# Patient Record
Sex: MALE | URBAN - METROPOLITAN AREA
[De-identification: names, ages, dates, MRNs, and addresses within clinical notes are randomized per-mention and may not be internally consistent; named-entity substitution may affect disease eponyms.]

---

## 2021-07-20 ENCOUNTER — OFFICE VISIT (OUTPATIENT)
Dept: URBAN - METROPOLITAN AREA CLINIC 24 | Facility: CLINIC | Age: 61
End: 2021-07-20
Payer: COMMERCIAL

## 2021-07-20 DIAGNOSIS — H26.493 OTHER SECONDARY CATARACT, BILATERAL: ICD-10-CM

## 2021-07-20 DIAGNOSIS — E11.3512 DIABETES MELLITUS TYPE 2 WITH PROLIFERATIVE RETINOPATHY WITH MACULAR EDEMA, LEFT EYE: ICD-10-CM

## 2021-07-20 DIAGNOSIS — H52.4 PRESBYOPIA: ICD-10-CM

## 2021-07-20 DIAGNOSIS — H16.143 PUNCTATE KERATITIS, BILATERAL: ICD-10-CM

## 2021-07-20 PROCEDURE — 92250 FUNDUS PHOTOGRAPHY W/I&R: CPT | Performed by: OPTOMETRIST

## 2021-07-20 PROCEDURE — 92134 CPTRZ OPH DX IMG PST SGM RTA: CPT | Performed by: OPTOMETRIST

## 2021-07-20 PROCEDURE — 99204 OFFICE O/P NEW MOD 45 MIN: CPT | Performed by: OPTOMETRIST

## 2021-07-20 ASSESSMENT — INTRAOCULAR PRESSURE
OD: 16
OS: 17

## 2021-07-20 ASSESSMENT — KERATOMETRY
OD: 43.65
OS: 43.41

## 2021-07-20 ASSESSMENT — VISUAL ACUITY
OS: 20/25
OD: 20/400

## 2021-07-20 NOTE — IMPRESSION/PLAN
Impression: Diabetes mellitus Type 2 with proliferative retinopathy with macular edema, left eye: C01.7214. Plan: See other diabetic plan.

## 2021-07-20 NOTE — IMPRESSION/PLAN
Impression: Diabetes mellitus Type 2 with proliferative retinopathy without macular edema, right eye: E11.3591. Plan: PDR with h/o PRP OU, injections OD only. (+) DME OD, refer to retina for consultation Discussed ocular and systemic benefits of blood sugar control.

## 2021-07-20 NOTE — IMPRESSION/PLAN
Impression: Presbyopia: H52.4. Plan: New srx released per patient request.
Monocular precautions reviewed, polycarbonate only. Understands BCVA Limited.

## 2022-01-05 ENCOUNTER — OFFICE VISIT (OUTPATIENT)
Dept: URBAN - METROPOLITAN AREA CLINIC 24 | Facility: CLINIC | Age: 62
End: 2022-01-05
Payer: COMMERCIAL

## 2022-01-05 DIAGNOSIS — H53.001 UNSPECIFIED AMBLYOPIA, RIGHT EYE: ICD-10-CM

## 2022-01-05 DIAGNOSIS — E11.3513 TYPE 2 DIABETES MELLITUS W/ PROLIFERATIVE DIABETIC RETINOPATHY W/ MACULAR EDEMA, BILATERAL: ICD-10-CM

## 2022-01-05 DIAGNOSIS — E11.3591 DIABETES MELLITUS TYPE 2 WITH PROLIFERATIVE RETINOPATHY WITHOUT MACULAR EDEMA, RIGHT EYE: Primary | ICD-10-CM

## 2022-01-05 DIAGNOSIS — Z79.4 LONG TERM (CURRENT) USE OF INSULIN: ICD-10-CM

## 2022-01-05 DIAGNOSIS — Z79.84 LONG TERM (CURRENT) USE OF ORAL ANTIDIABETIC DRUGS: ICD-10-CM

## 2022-01-05 PROCEDURE — 99204 OFFICE O/P NEW MOD 45 MIN: CPT | Performed by: OPHTHALMOLOGY

## 2022-01-05 PROCEDURE — 92134 CPTRZ OPH DX IMG PST SGM RTA: CPT | Performed by: OPHTHALMOLOGY

## 2022-01-05 ASSESSMENT — INTRAOCULAR PRESSURE
OS: 12
OD: 7

## 2022-01-05 NOTE — IMPRESSION/PLAN
Impression: DM2 w h/o PDR w PRP / Prior injx / [facility] for DME  [id number]. Plan: LONG h/o DM care w PDR w DME -- [place], [place] and [place], [place] and [place] -- NOW PDR with h/o PRP OU, inj w few chronic cysts DME and ISCHEMIA. LONG prior history of severe - now BG good A1c 6-7s for years per pt. RIGHT eye POOR since childhood. No addt'l injx. This is STABLE OD. Monitor d/t risks DME recur OS. TODAY rare cyst, fovea intact. MONITOR OS.    
   [facility] RETINA 4mo FA OS update -- HYDRATE Before

## 2022-01-05 NOTE — IMPRESSION/PLAN
Impression: Unspecified amblyopia, right Plan: RIGHT eye POOR since childhood. No addt'l injx. This is STABLE OD.

## 2022-01-05 NOTE — IMPRESSION/PLAN
Impression: Punctate keratitis, bilateral PCIOL Plan: Recommend Omega 3 fatty acids and ATs. PCIOL OU prior.   [facility] care Shandra et al for dry eye / MRx / etc.

## 2024-08-26 ENCOUNTER — OFFICE VISIT (OUTPATIENT)
Dept: URBAN - METROPOLITAN AREA CLINIC 24 | Facility: CLINIC | Age: 64
End: 2024-08-26
Payer: COMMERCIAL

## 2024-08-26 DIAGNOSIS — H53.001 UNSPECIFIED AMBLYOPIA, RIGHT EYE: ICD-10-CM

## 2024-08-26 DIAGNOSIS — E11.3513 TYPE 2 DIABETES MELLITUS WITH PROLIFERATIVE DIABETIC RETINOPATHY WITH MACULAR EDEMA, BILATERAL: Primary | ICD-10-CM

## 2024-08-26 DIAGNOSIS — H16.143 PUNCTATE KERATITIS, BILATERAL: ICD-10-CM

## 2024-08-26 PROCEDURE — 92235 FLUORESCEIN ANGRPH MLTIFRAME: CPT | Performed by: OPHTHALMOLOGY

## 2024-08-26 PROCEDURE — 92134 CPTRZ OPH DX IMG PST SGM RTA: CPT | Performed by: OPHTHALMOLOGY

## 2024-08-26 PROCEDURE — 99214 OFFICE O/P EST MOD 30 MIN: CPT | Performed by: OPHTHALMOLOGY

## 2024-08-26 ASSESSMENT — INTRAOCULAR PRESSURE
OS: 11
OD: 12